# Patient Record
Sex: FEMALE | Race: WHITE | URBAN - METROPOLITAN AREA
[De-identification: names, ages, dates, MRNs, and addresses within clinical notes are randomized per-mention and may not be internally consistent; named-entity substitution may affect disease eponyms.]

---

## 2017-09-15 ENCOUNTER — HOSPITAL ENCOUNTER (OUTPATIENT)
Dept: OTHER | Age: 57
Discharge: HOME | End: 2017-09-30
Attending: PSYCHIATRY & NEUROLOGY | Admitting: PSYCHIATRY & NEUROLOGY

## 2017-09-15 NOTE — PROGRESS NOTES
Occupational Therapy  Name: Jessica Frost  1960  Date: 9/15/2017    Time In: 1315  Time Out: 4149  Diagnosis: CVA (occipital lobe), August 2017  Seizure free for 1 year: yes    Hearing Aids: no  Glasses/contacts: reading  Mobility Status: no AD  Is client able to transfer into car: yes  License # [id number]  Restrictions on License: none  Expiration date: 9/9/20  Last time client drove: August 28, 2017  Car Make/Model and transmission type: Mercedes, automatic  Driving Habits: Frequency: everyday  Night: yes  Snow: yes  Highway: ? yes  [employer] in last 5 years: no  Accidents in last 5 years: no  Explain:    VISION:  Acuity: ([facility] law =20/40 night driving; 80/80 day driving): ____78/79____FZMYMUB corrective lenses  Peripheral field: ([address] [place] [address] requires 70? visual field on both sides of a fixation point for a non-restricted license or 39? on the other side)  INTACT    Color Vision:  INTACT       Saccades: (ability to rapidly change fixation from one point in the visual field to another)    INTACT   Pursuits: (the continued fixation of a moving object)    INTACT   Depth Perception:    INTACT   Motor Free Visual Perception Test (MVPT):  (Measures visual discrimination, visual memory, visual closure, visual organization, and figure ground skills)    INTACT     COGNITION:  Trail Making Test Parts A & B (involve scanning, speed of mental processing and visual motor sequencing.   Trail Making Part B involves switching cognitive sets too)  Trail Making Part A:   _____45.21______seconds (Norm for Age/Education level:____31.72_____seconds)   Trail Making Part B:   ______66.34_____seconds (Norm for Age/Education level:____68.74_____seconds)          ROAD SIGN RECOGNITION:  _____9___/9 presented correctly identified    PHYSICAL:          Sensation: _WFL__ _______________________________________________________    (exceptions to normal limits marked by \"X\" and explained)   AROM-  RIGHT AROM-  LEFT STRENGTH-RIGHT STRENGTH-LEFT SHOULDER       ELBOW       WRIST       HAND       HIP       KNEE       ANKLE         Deficits explained: ________________________________________________________     UE- RIGHT UE- LEFT LE-RIGHT LE-LEFT   PROPRIOCEPTION       LIGHT TOUCH         COORDINATION:  (\"X\" to signify intact or impaired)     INTACT IMPAIRED   NECK ROM x    HEEL TO SHIN x    FINGER-NOSE-KNEE x    SITTING BALANCE x    DIADOKOKINESIS x      Client's Stated Goal: *To be able to return to driving to return to work. OT G CODES:   Current:CH   Goal:CH   Discharge:CH    ON THE ROAD PERFORMANCE: *  Reacted appropriately to all situations encountered. RECOMMENDATIONS: * Resume driving.     CONOR Henry/ERLINDA, [address] [place] [address], [address]  Certified  Rehabilitation Specialist

## 2017-09-15 NOTE — PROGRESS NOTES
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  As you know, Mrs. Mary Kim suffered a CVA. She wants to resume driving to be independent in community mobility and leisure skills. Mrs. Mary Kim passed tests for visual acuity, saccades, pursuits, depth perception, color vision, peripheral vision and recognition of traffic signs and signals. She was administered the Motor Free Visual Perceptual Test and scored within normal limits. She was administered the Trails Making Tests Part A and B which are cognitive tests that involve scanning, speed of mental processing, visual motor sequencing, as well as switching cognitive sets. On Part A, she scored slightly below normal limits with 45.21 seconds over a norm for her age and education level of 31.72 seconds and on Part B, she scored within normal limits with 66.34 seconds with 0 errors over the norm of 68.74 seconds and 0 errors. She demonstrated functional physical capacity for driving. Behind the wheel, Mrs. Mary Kim was able to manipulate all vehicle controls. She drove on secondary and primary roads in light to moderately heavy traffic. She demonstrated the ability to park, back up, maintain speed and traffic flow, change lanes and follow directions. Responses to situations encountered were appropriate. Based on the results of this evaluation, it appears that Mrs. Mary Kim is a suitable candidate to resume driving. She is as safe as the driving public. The final decision remains with the physician. Please inform Mrs. Aquino of your decision. I can be reached at [phone] if questions arise.   Thank you for this referral.    Sincerely,    Gunnar High, [address] [place] [address], [address]  Certified  Rehabilitation Specialist